# Patient Record
Sex: MALE | Race: WHITE
[De-identification: names, ages, dates, MRNs, and addresses within clinical notes are randomized per-mention and may not be internally consistent; named-entity substitution may affect disease eponyms.]

---

## 2017-04-24 ENCOUNTER — HOSPITAL ENCOUNTER (OUTPATIENT)
Dept: HOSPITAL 35 - RAD | Age: 62
End: 2017-04-24
Attending: FAMILY MEDICINE
Payer: COMMERCIAL

## 2017-04-24 DIAGNOSIS — M25.462: Primary | ICD-10-CM

## 2017-04-24 DIAGNOSIS — M17.32: ICD-10-CM

## 2017-04-28 LAB
ALBUMIN SERPL-MCNC: 3.9 G/DL (ref 3.4–5)
ANION GAP SERPL CALC-SCNC: 9 MMOL/L (ref 7–16)
BILIRUB UR-MCNC: NEGATIVE MG/DL
BUN SERPL-MCNC: 24 MG/DL (ref 7–18)
CALCIUM SERPL-MCNC: 8.5 MG/DL (ref 8.5–10.1)
CHLORIDE SERPL-SCNC: 103 MMOL/L (ref 98–107)
CO2 SERPL-SCNC: 29 MMOL/L (ref 21–32)
COLOR UR: YELLOW
CREAT SERPL-MCNC: 0.9 MG/DL (ref 0.7–1.3)
ERYTHROCYTE [DISTWIDTH] IN BLOOD BY AUTOMATED COUNT: 13.3 % (ref 10.5–14.5)
GLUCOSE SERPL-MCNC: 84 MG/DL (ref 74–106)
HCT VFR BLD CALC: 41.4 % (ref 42–52)
HGB BLD-MCNC: 13.9 GM/DL (ref 14–18)
INR PPP: 1
KETONES UR STRIP-MCNC: NEGATIVE MG/DL
MCH RBC QN AUTO: 30.3 PG (ref 26–34)
MCHC RBC AUTO-ENTMCNC: 33.6 G/DL (ref 28–37)
MCV RBC: 90.1 FL (ref 80–100)
PLATELET # BLD: 383 THOU/UL (ref 150–400)
POTASSIUM SERPL-SCNC: 4.1 MMOL/L (ref 3.5–5.1)
PROTHROMBIN TIME: 10.1 SECONDS (ref 9.3–11.4)
RBC # BLD AUTO: 4.6 MIL/UL (ref 4.5–6)
RBC # UR STRIP: NEGATIVE /UL
SODIUM SERPL-SCNC: 141 MMOL/L (ref 136–145)
SP GR UR STRIP: 1.01 (ref 1–1.03)
URINE GLUCOSE-RANDOM*: NEGATIVE
URINE LEUKOCYTES-REFLEX: NEGATIVE
URINE PROTEIN (DIPSTICK): NEGATIVE
UROBILINOGEN UR STRIP-ACNC: 0.2 E.U./DL (ref 0.2–1)
WBC # BLD AUTO: 8.6 THOU/UL (ref 4–11)

## 2017-05-02 ENCOUNTER — HOSPITAL ENCOUNTER (INPATIENT)
Dept: HOSPITAL 35 - PRE | Age: 62
LOS: 1 days | Discharge: HOME | DRG: 468 | End: 2017-05-03
Attending: ORTHOPAEDIC SURGERY | Admitting: ORTHOPAEDIC SURGERY
Payer: COMMERCIAL

## 2017-05-02 VITALS — SYSTOLIC BLOOD PRESSURE: 106 MMHG | DIASTOLIC BLOOD PRESSURE: 63 MMHG

## 2017-05-02 VITALS — DIASTOLIC BLOOD PRESSURE: 68 MMHG | SYSTOLIC BLOOD PRESSURE: 122 MMHG

## 2017-05-02 VITALS — SYSTOLIC BLOOD PRESSURE: 109 MMHG | DIASTOLIC BLOOD PRESSURE: 70 MMHG

## 2017-05-02 VITALS — BODY MASS INDEX: 23.3 KG/M2 | HEIGHT: 72.01 IN | WEIGHT: 172 LBS

## 2017-05-02 VITALS — SYSTOLIC BLOOD PRESSURE: 114 MMHG | DIASTOLIC BLOOD PRESSURE: 81 MMHG

## 2017-05-02 VITALS — SYSTOLIC BLOOD PRESSURE: 113 MMHG | DIASTOLIC BLOOD PRESSURE: 75 MMHG

## 2017-05-02 VITALS — SYSTOLIC BLOOD PRESSURE: 117 MMHG | DIASTOLIC BLOOD PRESSURE: 70 MMHG

## 2017-05-02 VITALS — DIASTOLIC BLOOD PRESSURE: 72 MMHG | SYSTOLIC BLOOD PRESSURE: 113 MMHG

## 2017-05-02 VITALS — DIASTOLIC BLOOD PRESSURE: 68 MMHG | SYSTOLIC BLOOD PRESSURE: 109 MMHG

## 2017-05-02 DIAGNOSIS — F32.9: ICD-10-CM

## 2017-05-02 DIAGNOSIS — I48.0: ICD-10-CM

## 2017-05-02 DIAGNOSIS — I50.9: ICD-10-CM

## 2017-05-02 DIAGNOSIS — Z82.49: ICD-10-CM

## 2017-05-02 DIAGNOSIS — Z82.61: ICD-10-CM

## 2017-05-02 DIAGNOSIS — T84.023A: Primary | ICD-10-CM

## 2017-05-02 DIAGNOSIS — F41.9: ICD-10-CM

## 2017-05-02 PROCEDURE — 52001 CYSTO W/IRRG&EVAC MLT CLOTS: CPT

## 2017-05-02 PROCEDURE — 51771: CPT

## 2017-05-02 PROCEDURE — 51320: CPT

## 2017-05-02 PROCEDURE — 53000 INCISION OF URETHRA: CPT

## 2017-05-02 PROCEDURE — 50101: CPT

## 2017-05-02 PROCEDURE — 56527: CPT

## 2017-05-02 PROCEDURE — 53370: CPT

## 2017-05-02 PROCEDURE — 53337: CPT

## 2017-05-02 PROCEDURE — 62110: CPT

## 2017-05-02 PROCEDURE — 0SPU0JZ REMOVAL OF SYNTHETIC SUBSTITUTE FROM LEFT KNEE JOINT, FEMORAL SURFACE, OPEN APPROACH: ICD-10-PCS | Performed by: ORTHOPAEDIC SURGERY

## 2017-05-02 PROCEDURE — 70005: CPT

## 2017-05-02 PROCEDURE — 52282 CYSTOSCOPY IMPLANT STENT: CPT

## 2017-05-02 PROCEDURE — 50954: CPT

## 2017-05-02 PROCEDURE — 50415: CPT

## 2017-05-02 PROCEDURE — 51412: CPT

## 2017-05-02 PROCEDURE — 62900: CPT

## 2017-05-02 PROCEDURE — 10785: CPT

## 2017-05-02 PROCEDURE — 56525: CPT

## 2017-05-02 PROCEDURE — 50010 RENAL EXPLORATION: CPT

## 2017-05-02 PROCEDURE — 0SRU0JZ REPLACEMENT OF LEFT KNEE JOINT, FEMORAL SURFACE WITH SYNTHETIC SUBSTITUTE, OPEN APPROACH: ICD-10-PCS | Performed by: ORTHOPAEDIC SURGERY

## 2017-05-03 VITALS — SYSTOLIC BLOOD PRESSURE: 106 MMHG | DIASTOLIC BLOOD PRESSURE: 66 MMHG

## 2017-05-03 VITALS — SYSTOLIC BLOOD PRESSURE: 111 MMHG | DIASTOLIC BLOOD PRESSURE: 77 MMHG

## 2017-05-03 VITALS — DIASTOLIC BLOOD PRESSURE: 63 MMHG | SYSTOLIC BLOOD PRESSURE: 98 MMHG

## 2017-05-03 VITALS — DIASTOLIC BLOOD PRESSURE: 79 MMHG | SYSTOLIC BLOOD PRESSURE: 125 MMHG

## 2018-10-25 ENCOUNTER — HOSPITAL ENCOUNTER (OUTPATIENT)
Dept: HOSPITAL 61 - PCVCIMAG | Age: 63
Discharge: HOME | End: 2018-10-25
Attending: INTERNAL MEDICINE
Payer: COMMERCIAL

## 2018-10-25 DIAGNOSIS — I48.91: Primary | ICD-10-CM

## 2018-10-25 PROCEDURE — 93312 ECHO TRANSESOPHAGEAL: CPT

## 2018-10-25 PROCEDURE — 93325 DOPPLER ECHO COLOR FLOW MAPG: CPT

## 2018-10-25 NOTE — PCVCIMAG
--------------- APPROVED REPORT --------------





Study performed:  10/25/2018 09:27:24



EXAM: Transesophageal Echocardiogram

Patient Location: [facility]

Room #:  1

Status:  routine



BSA:         2.01

HR: 51 bpmBP:          117/72 mmHg

Rhythm: NSR



Other Information 

Study Quality: Good



Indications

Atrial Fibrillation

Pre ablation AGATA



Echo Enhancing Agent

Indication: Rule out Shunt

Agent(s) / Amount(s) Used: Agitated Saline 10 cc

Comments: Negative contrast study for shunt flow.



2D Dimensions

Ascending Ao:  32.00 (22-36mm)



Procedure

After obtaining informed consent, patient underwent transesophageal 

echo in the [facility]. 

Type of Sedation : Conscious Sedation

Sedation was administered by Elvia Adair RN. 

Sedation start time:  09:44           Case end Time:  09:51

Sedation was achieved intravenously with:  Versed (4mg)    Fentanyl 

(100mcg)    

Transesophageal probe was inserted and advanced into esophagus 

without difficulty by Malvin Carter MD.

Echo enhancement indication: R/O Septal defect.

Echo enhancement agent administered: Agitated Saline

The AGATA was performed without complications. 

Throughout the procedure, the blood pressure, pulse oximetry, cardiac 

rhythm, and rate were monitored.

The patient tolerated the procedure without adverse effects. Recovery 

from conscious sedation was uneventful and vital signs were 

stable.



Left Ventricle

The left ventricle is normal size. There is normal LV segmental wall 

motion. There is normal left ventricular wall thickness. Left 

ventricular systolic function is normal. LVEF is 60%.



Right Ventricle

The right ventricle is normal size. The right ventricular systolic 

function is normal.



Atria

The left atrium size is normal. No thrombus is visualized in the left 

atrium or appendage. No shunting by contrast bubble injection The 

right atrium size is normal.



Aortic Valve

Aortic valve is trileaflet, normal in structure and function. No 

aortic regurgitation is present. There is no aortic valvular 

stenosis.



Mitral Valve

The mitral valve is normal in structure. There is no mitral valve 

regurgitation noted. No evidence of mitral valve stenosis.



Tricuspid Valve

The tricuspid valve is normal in structure. Trace tricuspid 

regurgitation.



Pulmonic Valve

The pulmonary valve is normal in structure. There is no pulmonic 

valvular regurgitation.



Great Vessels

The aortic root is normal in size. Mild atherosclerotic plaquing 

throughout the aorta IVC is normal in size and collapses &gt;50% with 

inspiration.



Pericardium

There is no pericardial effusion. There is no pleural 

effusion.



&lt;Conclusion&gt;

Left ventricular systolic function is normal.

LVEF is 60%.

No thrombus is visualized in the left atrium or appendage.

No shunting by contrast bubble injection

Aortic valve is trileaflet,  normal in structure and function. No 

aortic regurgitation or stenosis.

The mitral valve is normal in structure.  No mitral valve 

regurgitation

Mild atherosclerotic plaquing throughout the aorta

There is no pericardial effusion.

## 2018-10-30 ENCOUNTER — HOSPITAL ENCOUNTER (OUTPATIENT)
Dept: HOSPITAL 35 - CAT | Age: 63
End: 2018-10-30
Attending: INTERNAL MEDICINE
Payer: COMMERCIAL

## 2018-10-30 DIAGNOSIS — J43.9: Primary | ICD-10-CM

## 2018-10-30 LAB
ALBUMIN SERPL-MCNC: 3.8 G/DL (ref 3.4–5)
ALT SERPL-CCNC: 22 U/L (ref 30–65)
ANION GAP SERPL CALC-SCNC: 9 MMOL/L (ref 7–16)
AST SERPL-CCNC: 16 U/L (ref 15–37)
BILIRUB SERPL-MCNC: 0.4 MG/DL
BUN SERPL-MCNC: 15 MG/DL (ref 7–18)
CALCIUM SERPL-MCNC: 9.3 MG/DL (ref 8.5–10.1)
CHLORIDE SERPL-SCNC: 104 MMOL/L (ref 98–107)
CO2 SERPL-SCNC: 29 MMOL/L (ref 21–32)
CREAT SERPL-MCNC: 0.9 MG/DL (ref 0.7–1.3)
ERYTHROCYTE [DISTWIDTH] IN BLOOD BY AUTOMATED COUNT: 13.4 % (ref 10.5–14.5)
GLUCOSE SERPL-MCNC: 106 MG/DL (ref 74–106)
HCT VFR BLD CALC: 40.8 % (ref 42–52)
HGB BLD-MCNC: 13.9 GM/DL (ref 14–18)
MCH RBC QN AUTO: 31.1 PG (ref 26–34)
MCHC RBC AUTO-ENTMCNC: 34.1 G/DL (ref 28–37)
MCV RBC: 91.3 FL (ref 80–100)
PLATELET # BLD: 359 THOU/UL (ref 150–400)
POTASSIUM SERPL-SCNC: 4.2 MMOL/L (ref 3.5–5.1)
PROT SERPL-MCNC: 7.8 G/DL (ref 6.4–8.2)
RBC # BLD AUTO: 4.47 MIL/UL (ref 4.5–6)
SODIUM SERPL-SCNC: 142 MMOL/L (ref 136–145)
WBC # BLD AUTO: 7.4 THOU/UL (ref 4–11)

## 2018-11-02 ENCOUNTER — HOSPITAL ENCOUNTER (OUTPATIENT)
Dept: HOSPITAL 35 - CATH | Age: 63
Setting detail: OBSERVATION
LOS: 1 days | Discharge: HOME | End: 2018-11-03
Attending: INTERNAL MEDICINE | Admitting: INTERNAL MEDICINE
Payer: COMMERCIAL

## 2018-11-02 VITALS — DIASTOLIC BLOOD PRESSURE: 67 MMHG | SYSTOLIC BLOOD PRESSURE: 96 MMHG

## 2018-11-02 VITALS — WEIGHT: 175 LBS | HEIGHT: 72 IN | BODY MASS INDEX: 23.7 KG/M2

## 2018-11-02 VITALS — DIASTOLIC BLOOD PRESSURE: 68 MMHG | SYSTOLIC BLOOD PRESSURE: 96 MMHG

## 2018-11-02 VITALS — DIASTOLIC BLOOD PRESSURE: 52 MMHG | SYSTOLIC BLOOD PRESSURE: 94 MMHG

## 2018-11-02 VITALS — DIASTOLIC BLOOD PRESSURE: 68 MMHG | SYSTOLIC BLOOD PRESSURE: 90 MMHG

## 2018-11-02 VITALS — SYSTOLIC BLOOD PRESSURE: 117 MMHG | DIASTOLIC BLOOD PRESSURE: 80 MMHG

## 2018-11-02 VITALS — SYSTOLIC BLOOD PRESSURE: 106 MMHG | DIASTOLIC BLOOD PRESSURE: 72 MMHG

## 2018-11-02 VITALS — SYSTOLIC BLOOD PRESSURE: 103 MMHG | DIASTOLIC BLOOD PRESSURE: 73 MMHG

## 2018-11-02 DIAGNOSIS — I47.1: ICD-10-CM

## 2018-11-02 DIAGNOSIS — Z79.899: ICD-10-CM

## 2018-11-02 DIAGNOSIS — I10: ICD-10-CM

## 2018-11-02 DIAGNOSIS — G47.33: ICD-10-CM

## 2018-11-02 DIAGNOSIS — I48.92: ICD-10-CM

## 2018-11-02 DIAGNOSIS — I48.0: Primary | ICD-10-CM

## 2018-11-02 DIAGNOSIS — Z98.890: ICD-10-CM

## 2018-11-02 LAB
ALBUMIN SERPL-MCNC: 3.7 G/DL (ref 3.4–5)
ALT SERPL-CCNC: 23 U/L (ref 30–65)
ANION GAP SERPL CALC-SCNC: 8 MMOL/L (ref 7–16)
APTT BLD: 27.3 SECONDS (ref 24.5–32.8)
AST SERPL-CCNC: 20 U/L (ref 15–37)
BASOPHILS NFR BLD AUTO: 0.7 % (ref 0–2)
BILIRUB SERPL-MCNC: 0.4 MG/DL
BUN SERPL-MCNC: 19 MG/DL (ref 7–18)
CALCIUM SERPL-MCNC: 9.3 MG/DL (ref 8.5–10.1)
CHLORIDE SERPL-SCNC: 105 MMOL/L (ref 98–107)
CO2 SERPL-SCNC: 27 MMOL/L (ref 21–32)
CREAT SERPL-MCNC: 1 MG/DL (ref 0.7–1.3)
EOSINOPHIL NFR BLD: 4 % (ref 0–3)
ERYTHROCYTE [DISTWIDTH] IN BLOOD BY AUTOMATED COUNT: 13.3 % (ref 10.5–14.5)
GLUCOSE SERPL-MCNC: 103 MG/DL (ref 74–106)
GRANULOCYTES NFR BLD MANUAL: 57.7 % (ref 36–66)
HCT VFR BLD CALC: 40.5 % (ref 42–52)
HGB BLD-MCNC: 13.8 GM/DL (ref 14–18)
INR PPP: 1
LYMPHOCYTES NFR BLD AUTO: 28.3 % (ref 24–44)
MCH RBC QN AUTO: 30.9 PG (ref 26–34)
MCHC RBC AUTO-ENTMCNC: 33.9 G/DL (ref 28–37)
MCV RBC: 91.2 FL (ref 80–100)
MONOCYTES NFR BLD: 9.3 % (ref 1–8)
NEUTROPHILS # BLD: 4 THOU/UL (ref 1.4–8.2)
PLATELET # BLD: 360 THOU/UL (ref 150–400)
POTASSIUM SERPL-SCNC: 4 MMOL/L (ref 3.5–5.1)
PROT SERPL-MCNC: 7.7 G/DL (ref 6.4–8.2)
PROTHROMBIN TIME: 10.5 SECONDS (ref 9.3–11.4)
RBC # BLD AUTO: 4.44 MIL/UL (ref 4.5–6)
SODIUM SERPL-SCNC: 140 MMOL/L (ref 136–145)
WBC # BLD AUTO: 6.9 THOU/UL (ref 4–11)

## 2018-11-02 PROCEDURE — 65040: CPT

## 2018-11-02 PROCEDURE — 70005: CPT

## 2018-11-02 PROCEDURE — 62900: CPT

## 2018-11-02 PROCEDURE — 62110: CPT

## 2018-11-02 PROCEDURE — 65020: CPT

## 2018-11-03 VITALS — SYSTOLIC BLOOD PRESSURE: 105 MMHG | DIASTOLIC BLOOD PRESSURE: 69 MMHG

## 2018-11-03 VITALS — DIASTOLIC BLOOD PRESSURE: 66 MMHG | SYSTOLIC BLOOD PRESSURE: 109 MMHG

## 2018-11-03 VITALS — DIASTOLIC BLOOD PRESSURE: 61 MMHG | SYSTOLIC BLOOD PRESSURE: 94 MMHG

## 2018-11-03 VITALS — SYSTOLIC BLOOD PRESSURE: 106 MMHG | DIASTOLIC BLOOD PRESSURE: 68 MMHG

## 2018-11-03 VITALS — SYSTOLIC BLOOD PRESSURE: 109 MMHG | DIASTOLIC BLOOD PRESSURE: 66 MMHG

## 2019-10-04 ENCOUNTER — HOSPITAL ENCOUNTER (OUTPATIENT)
Dept: HOSPITAL 35 - CATH | Age: 64
Setting detail: OBSERVATION
Discharge: HOME | End: 2019-10-04
Attending: INTERNAL MEDICINE | Admitting: INTERNAL MEDICINE
Payer: COMMERCIAL

## 2019-10-04 VITALS — DIASTOLIC BLOOD PRESSURE: 75 MMHG | SYSTOLIC BLOOD PRESSURE: 112 MMHG

## 2019-10-04 VITALS — SYSTOLIC BLOOD PRESSURE: 105 MMHG | DIASTOLIC BLOOD PRESSURE: 80 MMHG

## 2019-10-04 VITALS — SYSTOLIC BLOOD PRESSURE: 110 MMHG | DIASTOLIC BLOOD PRESSURE: 73 MMHG

## 2019-10-04 VITALS — DIASTOLIC BLOOD PRESSURE: 80 MMHG | SYSTOLIC BLOOD PRESSURE: 112 MMHG

## 2019-10-04 VITALS — DIASTOLIC BLOOD PRESSURE: 78 MMHG | SYSTOLIC BLOOD PRESSURE: 114 MMHG

## 2019-10-04 VITALS — DIASTOLIC BLOOD PRESSURE: 80 MMHG | SYSTOLIC BLOOD PRESSURE: 117 MMHG

## 2019-10-04 VITALS — BODY MASS INDEX: 23.8 KG/M2 | HEIGHT: 71 IN | WEIGHT: 170 LBS

## 2019-10-04 VITALS — SYSTOLIC BLOOD PRESSURE: 107 MMHG | DIASTOLIC BLOOD PRESSURE: 73 MMHG

## 2019-10-04 VITALS — DIASTOLIC BLOOD PRESSURE: 76 MMHG | SYSTOLIC BLOOD PRESSURE: 112 MMHG

## 2019-10-04 DIAGNOSIS — I47.1: Primary | ICD-10-CM

## 2019-10-04 LAB
ANION GAP SERPL CALC-SCNC: 7 MMOL/L (ref 7–16)
APTT BLD: 26.3 SECONDS (ref 24.5–32.8)
BASOPHILS NFR BLD AUTO: 0.5 % (ref 0–2)
BUN SERPL-MCNC: 17 MG/DL (ref 7–18)
CALCIUM SERPL-MCNC: 9.5 MG/DL (ref 8.5–10.1)
CHLORIDE SERPL-SCNC: 105 MMOL/L (ref 98–107)
CO2 SERPL-SCNC: 30 MMOL/L (ref 21–32)
CREAT SERPL-MCNC: 0.7 MG/DL (ref 0.7–1.3)
EOSINOPHIL NFR BLD: 2.2 % (ref 0–3)
ERYTHROCYTE [DISTWIDTH] IN BLOOD BY AUTOMATED COUNT: 13.2 % (ref 10.5–14.5)
GLUCOSE SERPL-MCNC: 93 MG/DL (ref 74–106)
GRANULOCYTES NFR BLD MANUAL: 60.3 % (ref 36–66)
HCT VFR BLD CALC: 42.8 % (ref 42–52)
HGB BLD-MCNC: 14.2 GM/DL (ref 14–18)
INR PPP: 1
LYMPHOCYTES NFR BLD AUTO: 28.3 % (ref 24–44)
MCH RBC QN AUTO: 30.8 PG (ref 26–34)
MCHC RBC AUTO-ENTMCNC: 33.1 G/DL (ref 28–37)
MCV RBC: 93.1 FL (ref 80–100)
MONOCYTES NFR BLD: 8.7 % (ref 1–8)
NEUTROPHILS # BLD: 3.9 THOU/UL (ref 1.4–8.2)
PLATELET # BLD: 341 THOU/UL (ref 150–400)
POTASSIUM SERPL-SCNC: 4.4 MMOL/L (ref 3.5–5.1)
PROTHROMBIN TIME: 10.2 SECONDS (ref 9.3–11.4)
RBC # BLD AUTO: 4.59 MIL/UL (ref 4.5–6)
SODIUM SERPL-SCNC: 142 MMOL/L (ref 136–145)
WBC # BLD AUTO: 6.4 THOU/UL (ref 4–11)

## 2019-10-04 PROCEDURE — 70005: CPT

## 2019-10-04 PROCEDURE — 62110: CPT

## 2019-10-04 PROCEDURE — 62900: CPT

## 2019-10-04 NOTE — NUR
PT ADMITED FROM CATH LAB. ALERT AND ORIENTED. VSS. WAS ON BED REST FOR 3
HOURS. RIGHT AND LEFT GROIN INCISION C/D/I. NO HEMATOMA NOTED. INSTRUCTIONS
GIVEN TO DISCHARGE PT AFTER BEDREST. DISCHARGE INSTRUCTIONS GIVEN TO PT AND
THE WIFE. PT VERBERLISED UNDERSTANDING.

## 2019-10-08 NOTE — P
[facility]
[address]
[place]   [address]                     PROCEDURE REPORT              
_______________________________________________________________________________
 
Name:       SELENA GUIDRY             Room #:         212-P       [facility] MATTHEW#:      [id number]                       Account #:      [id number]  
Admission:  10/04/19    Attend Phys:    Frank Wright MD
Discharge:  10/04/19    Date of Birth:  [birth year]  
                                                          Report #: [id number]
                                                                    [id number]   
_______________________________________________________________________________
THIS REPORT FOR:   //name//                          
 
CC: Frank Johnen Millie
 
DATE OF SERVICE:  10/04/2019
 
 
PREOPERATIVE DIAGNOSIS:  Atrioventricular shaan reentry tachycardia.
 
POSTOPERATIVE DIAGNOSIS:  Atrioventricular shaan reentry tachycardia.
 
PROCEDURES PERFORMED:
1.  SVT ablation, CPT code 42571.
2.  EP with left atrial pacing and recording, CPT code 09157.
3.  A 3D mapping, CPT code 59537.
4.  Program stimulation pacing after IV drug infusion, CPT code 16406.
 
HISTORY:  The patient is a 64-year-old male with history of paroxysmal atrial
fibrillation who underwent AFib ablation.  At the time of his ablation, EP study
was performed and he was found to have typical AV shaan reentrant tachycardia. 
This underwent successful ablation and he was rendered noninducible and per my
notes he had good junctionals.  Recently, the patient presented to the Emergency
Room with palpitations and was noted to be in SVT, which terminated with IV
adenosine.  He is here for a repeat EP study and possible ablation.
 
ANESTHESIA:  The patient underwent MAC anesthesia with no anesthesia related
complications.
 
DESCRIPTION OF PROCEDURE:  The patient underwent informed consent.  We discussed
the details of the procedure including the risks, which include but not limited
to bleeding, vascular damage, cardiac perforation, stroke, MI as well as damage
to the native conduction system requiring a permanent pacemaker.  As such, the
patient was brought to the EP laboratory in a fasting and sedated state and
prepped and draped in a sterile fashion.  I injected lidocaine at the groin and
obtained access to the bilateral femoral veins placing an 8 and 6-[place] short
sheath in the right femoral vein and a 6 and 7-[place] short sheath in the left
femoral vein.  Next, under fluoroscopy, I placed a decapolar catheter easily in
the coronary sinus.  Of note, he has a very vertical coronary sinus, which I
noted at the time of my initial ablation.  He also has a large coronary sinus
ostium and the His catheter would easily flip from the His position deep into
the coronary sinus.  All catheters were positioned in a stable standard fashion
and the decapolar catheter was used for left atrial pacing and recording.
 
Next, a basic EP study was performed.  At baseline, the patient was in sinus
rhythm with a sinus cycle length of 1145 milliseconds, [place] interval 150
 
 
 
[address]   [address]                     PROCEDURE REPORT              
_______________________________________________________________________________
 
Name:       SELENA GUIDRY LUCIO             Room #:         212-P       [facility] 
M.R.#:      [id number]                       Account #:      [id number]  
Admission:  10/04/19    Attend Phys:    Frank Wright MD
Discharge:  10/04/19    Date of Birth:  [birth year]  
                                                          Report #: [id number]
                                                                    [id number]   
_______________________________________________________________________________
milliseconds, QRS duration 110 milliseconds, QT interval 465 milliseconds, AH
interval 100 milliseconds, and HV interval 40 milliseconds.  Atrial burst pacing
was performed.  It was difficult to determine AV block as his atrial refractory
time was somewhat long.  Thresholds were stable, but if I would start pacing
around 350 milliseconds, the atrial lead was stopped capturing.  This was the
same for the His catheter.  Atrial extrastimuli were delivered at 280
milliseconds at 500 millisecond basic drive cycle length.  Ventricular pacing
was performed and VA block was noted at 470 milliseconds and VA ERP was noted at
450 milliseconds at 500 millisecond basic drive cycle length.  VA conduction
appeared both in line and decremental.  The patient had accidentally taken a
dose of his beta blocker yesterday morning, so we quickly moved into the
isoproterenol infusion at 2 mcg per minute.  VA block was noted at 410
milliseconds, VA ERP is noted 270 milliseconds at 450 millisecond basic drive
cycle length and AV block was now at around 280 milliseconds.  I performed
aggressive atrial burst pacing and I could not induce AVNRT.  I then gave a
single atrial extrastimuli and at 200 milliseconds at a 400 millisecond basic
drive cycle length, the patient went into SVT.  This was an accidental induction
of SVT is after his S2, he had a little burst of atrial tachycardia or atrial
flutter that put him into AVNRT.  I attempted to entrain this and this was
unsuccessful.  I looked back at my prior note and it appeared that I could
easily induce him with double atrial extrastimuli.  Therefore, I used this
method and I could be more easily induce his SVT.  The SVT was consistent with
typical AV shaan reentrant tachycardia with a septal VA time of 35 milliseconds,
tachycardia cycle length of 280 milliseconds.  I was able to demonstrate good
entrainment twice with a VAHV response consistent with typical AV shaan
reentrant tachycardia.  As such, the isoproterenol infusion was turned off.  I
removed my HRA catheter and exchanged this sheath for a SR0 and a 4-mm [employer]
Mckeon ablation catheter.
 
3D MAPPING AND ABLATION:  Using the above ablation catheter, a 3D geometry of
the right atrium, His cloud region and the coronary sinus was obtained.  Again,
the ablation catheter would easily fall into the large coronary sinus that was
quite vertical.  I then looked for slow pathway potential and I quickly came
across a nice slow pathway potential with a nice ratio between the A and
ventricular signal.  This was at a very septal location.  I decided to ablate
here at 50 high and 55 degrees.  The first ablation lesion resulted in about 30
seconds of nice slow junctionals.  I then deflected 1 or 2 mm higher and again
came on ablation and this time we had nice slow junctionals for about 30
seconds.  I decided to perform an additional lesion at the same site and again
we had approximately 40 seconds of junctionals that actually continued after we
ablated.  As such, I decided to perform repeat testing.  Isoproterenol infusion
was started at 2 mcg per minute.  AV block was noted at 270 milliseconds. 
Again, I tried a single atrial extrastimuli to induce tachycardia and this did
not seem to work.  Again gave double atrial extrastimuli at multiple cycle
lengths and we could no longer induce AV shaan reentrant tachycardia.  As such,
the isoproterenol infusion was turned off.  Post-ablation, he was in sinus
 
 
 
[facility]
[address]
[place]   [address]                     PROCEDURE REPORT              
_______________________________________________________________________________
 
Name:       SELENA GUIDRY LUCIO             Room #:         212-P       [facility] MATTHEW#:      [id number]                       Account #:      [id number]  
Admission:  10/04/19    Attend Phys:    Frank Wright MD
Discharge:  10/04/19    Date of Birth:  [birth year]  
                                                          Report #: [id number]
                                                                    [id number]   
_______________________________________________________________________________
rhythm with a sinus cycle length of 590 milliseconds, [place] interval 135
milliseconds, QRS duration 90 milliseconds, QT interval 335 milliseconds, AH
interval 96 milliseconds, and HV interval of 38 milliseconds.  As such,
catheters and sheaths were pulled.  Hemostasis was obtained and the patient
awoke neurologically and hemodynamically intact with no significant bleeding.
 
CONCLUSIONS:
1.  Successful repeat ablation for typical AV shaan reentrant tachycardia.
2.  Normal SA shaan function.
3.  Normal AV shaan function.
4.  Normal His-Purkinje function.
5.  No other inducible arrhythmias on or off isoproterenol.
 
 
 
 
 
 
 
 
 
 
 
 
 
 
 
 
 
 
 
 
 
 
 
 
 
 
 
 
 
 
 
 
  <ELECTRONICALLY SIGNED>
   By: Frank Wright MD        
  10/08/19     1555
D: 10/04/19 1610                           _____________________________________
T: 10/05/19 0125                           Frank Wright MD          /nt